# Patient Record
Sex: FEMALE | Race: WHITE | NOT HISPANIC OR LATINO | URBAN - METROPOLITAN AREA
[De-identification: names, ages, dates, MRNs, and addresses within clinical notes are randomized per-mention and may not be internally consistent; named-entity substitution may affect disease eponyms.]

---

## 2017-07-25 ENCOUNTER — EMERGENCY (EMERGENCY)
Facility: HOSPITAL | Age: 77
LOS: 1 days | Discharge: HOME | End: 2017-07-25
Attending: EMERGENCY MEDICINE
Payer: MEDICARE

## 2017-07-25 VITALS
HEART RATE: 60 BPM | SYSTOLIC BLOOD PRESSURE: 134 MMHG | DIASTOLIC BLOOD PRESSURE: 63 MMHG | RESPIRATION RATE: 16 BRPM | OXYGEN SATURATION: 100 % | TEMPERATURE: 98 F

## 2017-07-25 VITALS
OXYGEN SATURATION: 100 % | TEMPERATURE: 98 F | HEART RATE: 66 BPM | DIASTOLIC BLOOD PRESSURE: 83 MMHG | SYSTOLIC BLOOD PRESSURE: 160 MMHG | RESPIRATION RATE: 20 BRPM

## 2017-07-25 PROCEDURE — 99283 EMERGENCY DEPT VISIT LOW MDM: CPT

## 2017-07-25 RX ORDER — IBUPROFEN 200 MG
600 TABLET ORAL ONCE
Qty: 0 | Refills: 0 | Status: COMPLETED | OUTPATIENT
Start: 2017-07-25 | End: 2017-07-25

## 2017-07-25 RX ADMIN — Medication 600 MILLIGRAM(S): at 16:17

## 2017-07-25 NOTE — ED PROVIDER NOTE - ATTENDING CONTRIBUTION TO CARE
I was physically present for the E/M service provided. I agree with above history, physical, and plan which I have reviewed and edited where appropriate. I was physically present for the key portions of the service provided.    HPI: 77 F with previous right hip repkacement pw right hip pain s/p mechanical fall  PE: NCAT, EOMI, MMM, NL s1s2, rrr, no obvious rle deformity. no int/ext rotation or shortening. dp/pt plses 2+  Plan: r/o fx, xrays, pain control

## 2017-07-25 NOTE — ED PROVIDER NOTE - NS_EDPROVIDERDISPOUSERTYPE_ED_A_ED
I have personally evaluated and examined the patient. The Attending was available to me as a supervising provider if needed. I have personally evaluated and examined the patient. The Attending was available to me as a supervising provider if needed./Attending Attestation (For Attendings USE Only)...

## 2017-07-25 NOTE — ED PROVIDER NOTE - PHYSICAL EXAMINATION
RUE: 5/5 strength, no obvious bony deformity, NT, nv-intact  R hip: negative log roll, steady unassisted gait  no c-spine/midline tenderness

## 2017-07-25 NOTE — ED PROVIDER NOTE - MEDICAL DECISION MAKING DETAILS
76 y/o female, R sided pain s/p slip/fall today, pt states she feels "fine" and ready to go home, refusing xray; negative log roll, no obvious bony deformity to RUE/RLE, c-spine/midline, steady unassisted gait; pt also refused Tylenol; accepted Ice packs, confirms good follow up with PMD and herbal pain remedies at home.

## 2017-07-25 NOTE — ED ADULT TRIAGE NOTE - CHIEF COMPLAINT QUOTE
trip and fall in the store landing on her left side.  Negative loc no dizziness prior to fall.  denies striking her head.  Ambulatory at the scene

## 2017-07-25 NOTE — ED PROVIDER NOTE - OBJECTIVE STATEMENT
78 y/o female, PSx R hip replacement (2015), c/o R hip and RUE pain s/p slip/fall today while shopping. Denies LOC, syncope, cp, hitting head, back pain, saddle anesthesia, paresthesia, sciatica, SOB, dyspnea or any other concerns.

## 2017-07-28 DIAGNOSIS — Z96.641 PRESENCE OF RIGHT ARTIFICIAL HIP JOINT: ICD-10-CM

## 2017-07-28 DIAGNOSIS — Z04.3 ENCOUNTER FOR EXAMINATION AND OBSERVATION FOLLOWING OTHER ACCIDENT: ICD-10-CM

## 2021-06-23 ENCOUNTER — APPOINTMENT (OUTPATIENT)
Dept: ORTHOPEDIC SURGERY | Facility: CLINIC | Age: 81
End: 2021-06-23
Payer: MEDICARE

## 2021-06-23 VITALS
HEART RATE: 91 BPM | DIASTOLIC BLOOD PRESSURE: 84 MMHG | BODY MASS INDEX: 27.56 KG/M2 | HEIGHT: 61 IN | WEIGHT: 146 LBS | SYSTOLIC BLOOD PRESSURE: 177 MMHG

## 2021-06-23 VITALS — TEMPERATURE: 96.5 F

## 2021-06-23 DIAGNOSIS — M17.0 BILATERAL PRIMARY OSTEOARTHRITIS OF KNEE: ICD-10-CM

## 2021-06-23 DIAGNOSIS — R22.42 LOCALIZED SWELLING, MASS AND LUMP, LEFT LOWER LIMB: ICD-10-CM

## 2021-06-23 DIAGNOSIS — M16.12 UNILATERAL PRIMARY OSTEOARTHRITIS, LEFT HIP: ICD-10-CM

## 2021-06-23 DIAGNOSIS — Z96.641 PRESENCE OF RIGHT ARTIFICIAL HIP JOINT: ICD-10-CM

## 2021-06-23 PROCEDURE — 73564 X-RAY EXAM KNEE 4 OR MORE: CPT | Mod: 50

## 2021-06-23 PROCEDURE — 99204 OFFICE O/P NEW MOD 45 MIN: CPT

## 2021-06-23 PROCEDURE — 73502 X-RAY EXAM HIP UNI 2-3 VIEWS: CPT

## 2021-06-23 PROCEDURE — 99072 ADDL SUPL MATRL&STAF TM PHE: CPT

## 2021-06-23 RX ORDER — DICLOFENAC SODIUM 20 MG/G
2 SOLUTION TOPICAL
Qty: 1 | Refills: 0 | Status: ACTIVE | COMMUNITY
Start: 2021-06-23 | End: 1900-01-01

## 2021-06-23 NOTE — DISCUSSION/SUMMARY
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 Models, diagrams and drawings were used in the discussion. Treatment can include conservative non-operative management and surgical options. Conservative management includes weight loss, activity modification, physical therapy to improve motion and strength in the muscles around the knee and the body's core, PO and topical NSAIDs, corticosteroid and/or viscosupplementation intra-articular injections. If the patient fails to improve with non-operative management, surgical management is possible. Depending upon the patient's age, BMI, activity level, degree and location of arthrosis different surgical options are possible including arthroscopic debridement with chondroplasty, high-tibial osteotomy, unicondylar/partial arthroplasty, and total joint arthroplasty.\par \par patient is not ammenable to injection.\par \par patient cannot get oral nsaids due to age\par \par Physical therapy was prescribed for knee and hip ROM exercises, strengthening exercises, deep tissue massage, core strengthening, hip abductor strengthening, VMO strengthening, modalities PRN, and home exercises, it band stretching\par \par \par The patient was prescribed Diclofenac topical liquid/creme non-steroidal anti-inflammatory medication. 1-2 pumps twice daily and apply to area with pain. There is low systemic absorption of the medication but risks while reduced remain were discussed and include but not limited to renal damage and GI ulceration and bleeding. They were warned to stop the medication if worsening buring skin or gastric pain or dizziness or other side effects. Also to immediately stop the medication and seek appropriate medical attention if any severe stomach ache, gastritis, black/red vomit, black/red stools or any other medical concern.\par \par left knee mass, concerning for possible malignancy, recommend mri w and wo iv contrast\par \par The patient verifies their understanding the the visit, diagnosis and plan. They agree with the treatment plan and will contact the office with any questions or problems.\par Follow up\par PRN

## 2021-06-23 NOTE — HISTORY OF PRESENT ILLNESS
[de-identified] : CC left  hip bilateral knees\par \par HPI 82 yo female presents with chronic onset many years of activity related pain in the groin and lateral left  hip and medial bilateral knees [without Injury]. The pain is worse, and rated a 10 out of 10, described as severe pain, [without radiation]. Rest makes the pain better and Walking makes the pain worse. The patient reports associated symptoms of enlarging medial left knee mass for several years with pain. The patient reports similar pain previously right hip prior to KEITH.\par \par \par Previous treatments include:\par Activity Modification	+\par Ice/Compression 	+\par NSAIDs  		-\par Physical Therapy 	-\par Cortisone Injection	-\par Surgery  		- right hip KEITH 2017 at [facility]\par \par Review of Systems is positive for the above musculoskeletal symptoms and is otherwise non-contributory for general, constitutional, psychiatric, neurologic, HEENT, cardiac, respiratory, gastrointestinal, reproductive, lymphatic, and dermatologic complaints.\par \par Consult By

## 2021-06-23 NOTE — PHYSICAL EXAM
[de-identified] : Physical Examination\par General: well nourished, in no acute distress, alert and oriented to person, place and time\par Psychiatric: normal mood and affect, no abnormal movements or speech patterns\par Eyes: vision intact - glasses\par Throat: no thyromegaly\par Lymph: no enlarged nodes, no lymphedema in extremity\par Respiratory: no wheezing, no shortness of breath with ambulation\par Cardiac: no cardiac leg swelling, 2+ peripheral pulses\par Neurology: normal gross sensation in extremities to light touch\par Abdomen: soft, non-tender, tympanic, no masses\par \par Musculoskeletal Examination\par Ambulation	+ antalgic gait, + cane and walker assistive devices\par \par Hip			Right			Left\par General\par      Swelling/Deformity	normal			normal	\par      Skin			normal			normal\par      Erythema		-			-\par      Standing Alignment	neutral			neutral\par Range of Motion\par      Flexion		90			90\par      Abduction		20			10\par      Flex ER		35			25\par      Flex IR		5			5\par Provocative Exam\par      Log Roll		-			+\par      Heel Strike		-			+\par      Fig 4			-			-\par      SLR			-			-\par Palpation\par      Public Symphysis	-			-\par      Groin   	 	-			+\par      Greater Trochanter	-			+\par      Piriformis		-			-\par      SI Joint		-			-\par \par Left knee with large 7 x 6 x 3 rubbery non-mobile subcutaneous mass, mild tenderness to palpation on distal posterior knee over hamstring tendons\par \par Motion\par      Knee Flexion		110			110\par      Knee Extension	0			0\par Patella\par      J Sign		-			-\par      Quad Medial/Lateral	1/1 1/1\par      Apprehension		-			-\par      Jimbo's		+			+\par      Grind Sign		+			+\par      Crepitus		+			+\par Palpation\par      Medial Joint Line	+			+\par      Medial Fem Condyle	-			-\par      Lateral Joint Line	-			-\par      Quad Tendon		-			-\par      Patella Tendon	-			-\par      Medial Patella		-			-\par      Lateral Patella 	-			-\par      Posterior Knee	-			-\par Ligamentous\par      Varus @ 0° / 30°	-/-			-/-\par      Valgus @ 0° / 30°	-/-			-/-\par Meniscus\par      Derrick		-			-\par      Flexion Pinch		-			-\par Strength Examination/Atrophy\par      Hip Flexors 		5+			5+\par      Quadriceps		5+			5+\par      Hamstring		5+			5+\par      Tibialis Anterior	5+			5+\par      Achilles/Soleus	5+			5+\par Sensation\par      Deep Peroneal	normal			normal\par      Superficial Peroneal 	normal			normal\par      Sural  		normal			normal\par      Posterior Tibial 	normal			normal\par      Saphneous 		normal			normal\par Pulses\par      DP			2+			2+\par  [de-identified] : 3 views of the affected Left  hip (AP and Frog Lateral a)\par were ordered, taken and evaluated by myself today and \par demonstrate:\par \par Right\par well positioned KEITH. no evidence of loosening dislocation or fracture\par \par Left\par [normal] acetabular morphology\par [normal] femoral head neck morphology\par small acetabular osteophytes\par mild medial asymmetric joint space loss\par [Spherical] femoral head [with osteophytes]\par \par 4 views of the affected bilateral knee (standing AP, flexing standing AP, 30degree flexed lateral, sunrise view)\par were ordered, obtained and evaluated by myself today and\par demonstrate:\par \par RIGHT\par There is mild 1 mm medial flexed knee asymmetric narrowing\par Small medial peripheral osteophytic lipping\par Trace suprapatellar effusion\par Small lateral osteophytes with mild patellofemoral joint space loss without evidence of tilt [or] subluxation on sunrise view\par Normal soft tissue density\par Otherwise normal osseous bone structure without fracture or dislocation\par \par LEFT\par There is mild 1 mm medial weightbearing asymmetric narrowing\par Small medial peripheral osteophytic lipping\par Trace suprapatellar effusion\par Small lateral osteophytes with mild patellofemoral joint space loss without evidence of tilt [or] subluxation on sunrise view\par large soft tissue shadow in area of mass, no calcifications or heterogeneity noted\par Otherwise normal osseous bone structure without fracture or dislocation

## 2022-07-05 ENCOUNTER — APPOINTMENT (OUTPATIENT)
Dept: ORTHOPEDIC SURGERY | Facility: CLINIC | Age: 82
End: 2022-07-05